# Patient Record
Sex: FEMALE | Race: ASIAN | NOT HISPANIC OR LATINO | URBAN - METROPOLITAN AREA
[De-identification: names, ages, dates, MRNs, and addresses within clinical notes are randomized per-mention and may not be internally consistent; named-entity substitution may affect disease eponyms.]

---

## 2017-09-22 ENCOUNTER — OFFICE VISIT - HEALTHEAST (OUTPATIENT)
Dept: FAMILY MEDICINE | Facility: CLINIC | Age: 42
End: 2017-09-22

## 2017-09-22 DIAGNOSIS — R10.2 PELVIC PAIN IN FEMALE: ICD-10-CM

## 2017-09-22 DIAGNOSIS — Z12.31 VISIT FOR SCREENING MAMMOGRAM: ICD-10-CM

## 2017-09-22 DIAGNOSIS — Z00.00 ROUTINE GENERAL MEDICAL EXAMINATION AT A HEALTH CARE FACILITY: ICD-10-CM

## 2017-09-22 DIAGNOSIS — Z12.4 SCREENING FOR CERVICAL CANCER: ICD-10-CM

## 2017-09-22 ASSESSMENT — MIFFLIN-ST. JEOR: SCORE: 1319.69

## 2017-09-25 ENCOUNTER — HOSPITAL ENCOUNTER (OUTPATIENT)
Dept: ULTRASOUND IMAGING | Facility: HOSPITAL | Age: 42
Discharge: HOME | End: 2017-09-25
Attending: FAMILY MEDICINE

## 2017-09-25 DIAGNOSIS — R10.2 PELVIC PAIN IN FEMALE: ICD-10-CM

## 2017-09-26 ENCOUNTER — COMMUNICATION - HEALTHEAST (OUTPATIENT)
Dept: FAMILY MEDICINE | Facility: CLINIC | Age: 42
End: 2017-09-26

## 2017-09-26 DIAGNOSIS — R10.2 PELVIC PAIN: ICD-10-CM

## 2017-09-28 LAB
HPV INTERPRETATION - HISTORICAL: NORMAL
HPV INTERPRETER - HISTORICAL: NORMAL

## 2017-09-30 LAB
BKR LAB AP ABNORMAL BLEEDING: NO
BKR LAB AP BIRTH CONTROL/HORMONES: NORMAL
BKR LAB AP CERVICAL APPEARANCE: NORMAL
BKR LAB AP GYN ADEQUACY: NORMAL
BKR LAB AP GYN INTERPRETATION: NORMAL
BKR LAB AP HPV REFLEX: NORMAL
BKR LAB AP LMP: NORMAL
BKR LAB AP PATIENT STATUS: NORMAL
BKR LAB AP PREVIOUS ABNORMAL: NO
BKR LAB AP PREVIOUS NORMAL: 2012
HIGH RISK?: NO
PATH REPORT.COMMENTS IMP SPEC: NORMAL
RESULT FLAG (HE HISTORICAL CONVERSION): NORMAL

## 2017-10-02 ENCOUNTER — COMMUNICATION - HEALTHEAST (OUTPATIENT)
Dept: FAMILY MEDICINE | Facility: CLINIC | Age: 42
End: 2017-10-02

## 2017-10-16 ENCOUNTER — HOSPITAL ENCOUNTER (OUTPATIENT)
Dept: MRI IMAGING | Facility: HOSPITAL | Age: 42
Discharge: HOME | End: 2017-10-16
Attending: FAMILY MEDICINE

## 2017-10-16 DIAGNOSIS — R10.2 PELVIC PAIN: ICD-10-CM

## 2017-10-20 ENCOUNTER — COMMUNICATION - HEALTHEAST (OUTPATIENT)
Dept: FAMILY MEDICINE | Facility: CLINIC | Age: 42
End: 2017-10-20

## 2017-10-20 DIAGNOSIS — N80.03 ADENOMYOSIS: ICD-10-CM

## 2017-10-20 DIAGNOSIS — R10.2 PELVIC PAIN IN FEMALE: ICD-10-CM

## 2017-10-27 ENCOUNTER — COMMUNICATION - HEALTHEAST (OUTPATIENT)
Dept: FAMILY MEDICINE | Facility: CLINIC | Age: 42
End: 2017-10-27

## 2021-05-29 ENCOUNTER — RECORDS - HEALTHEAST (OUTPATIENT)
Dept: ADMINISTRATIVE | Facility: CLINIC | Age: 46
End: 2021-05-29

## 2021-05-31 VITALS — BODY MASS INDEX: 33.16 KG/M2 | HEIGHT: 60 IN | WEIGHT: 168.9 LBS

## 2021-06-02 ENCOUNTER — RECORDS - HEALTHEAST (OUTPATIENT)
Dept: ADMINISTRATIVE | Facility: CLINIC | Age: 46
End: 2021-06-02

## 2021-06-13 NOTE — PROGRESS NOTES
Assessment:      Healthy female exam.     1. Routine general medical examination at a health care facility     2. Pelvic pain in female  US Pelvis With Transvaginal Non OB   3. Screening for cervical cancer  Gynecologic Cytology (PAP Smear)   4. Screening for diabetes mellitus     5. Screening for cholesterol level     6. Visit for screening mammogram  Mammo Screening Bilateral        Plan:     1. Routine general medical examination at a health care facility      2. Pelvic pain in female  Discussed with patient possible etiologies, including it may be a gynecological issue or possible musculoskeletal.  May also be abdominal etiology.  For this reason, for now we will start with ultrasound of the pelvis.  Follow-up once we have those results for further plan, if normal, consider additional imaging such as CT scan.  For now, can continue ibuprofen as needed.  - US Pelvis With Transvaginal Non OB; Future    3. Screening for cervical cancer  Will inform patient of results in 1-2 weeks when we have them  - Gynecologic Cytology (PAP Smear)    4. Visit for screening mammogram  Counseled patient, will order screening mammogram  - Mammo Screening Bilateral; Future       Await pap smear results.  Pelvic ultrasound.     Subjective:      Vinh Art is a 41 y.o. female, new to [facility], who presents for an annual exam. Has not had a well visit in several years, previously been seen in [place] and [facility].  The patient is not sexually active. The patient participates in regular exercise: yes. The patient reports that there is not domestic violence in her life. Additional concerns:     Left-sided pelvic pain: She is a  (1 ) with LMP 17.  Patient states that her symptoms have been ongoing for almost 10 years, however they are intermittent.  Usually it lasts about a week and then would be several months before she has recurrent symptoms, however she has had this left-sided pelvic pain constantly  for the past month.  Describes as a sharp pain that can radiate to her back.  Rates it as 5-6/10.  She has used ibuprofen up to 800 mg at times which has helped with the pain, other than the pain will return once medication wears off.  No fevers, chills, sweats, weight changes.  No abnormal vaginal bleeding.  No family members with history of cancer.  She is worried that she may have ovarian cancer.    Healthy Habits:   Regular Exercise: Yes  Sunscreen Use: No  Healthy Diet: No  Dental Visits Regularly: Yes  Seat Belt: Yes  Sexually active: No  Self Breast Exam Monthly:No  Hemoccults: No  Flex Sig: No  Colonoscopy: No  Lipid Profile: done in 2017  Glucose Screen: 2017  Prevention of Osteoporosis: N/A  Last Dexa: N/A        Immunization History   Administered Date(s) Administered     Tdap 02/10/2012     Immunization status: up to date and documented, declines influenza vaccine.    No exam data present    Gynecologic History  Patient's last menstrual period was 2017 (approximate).  Contraception: none, currently not sexually active  Last Pap: . Results were: normal  Last mammogram: never had one.     OB History    Para Term  AB Living   1    1    SAB TAB Ectopic Multiple Live Births             # Outcome Date GA Lbr Jag/2nd Weight Sex Delivery Anes PTL Lv   1 AB                   Current Outpatient Prescriptions   Medication Sig Dispense Refill     ibuprofen (ADVIL,MOTRIN) 200 MG tablet Take 200 mg by mouth every 6 (six) hours as needed for pain.       No current facility-administered medications for this visit.      No past medical history on file.  History reviewed. No pertinent surgical history.  Review of patient's allergies indicates not on file.  Family History   Problem Relation Age of Onset     Diabetes Mother      Hypertension Mother      Diabetes Father      Social History     Social History     Marital status: Single     Spouse name: N/A     Number of children: N/A  "    Years of education: N/A     Occupational History     Not on file.     Social History Main Topics     Smoking status: Never Smoker     Smokeless tobacco: Never Used     Alcohol use No     Drug use: No     Sexual activity: Not Currently     Partners: Male     Other Topics Concern     Not on file     Social History Narrative    2017: lives with mom and sister.  Single.  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       Review of Systems  General:  Denies problem  Eyes: Denies problem  Ears/Nose/Throat: Denies problem  Cardiovascular: Denies problem  Respiratory:  Denies problem  Gastrointestinal:  Denies problem  Genitourinary: Denies problem  Musculoskeletal:  Denies problem  Skin: Denies problem  Neurologic: Denies problem  Psychiatric: Denies problem  Endocrine: Denies problem  Heme/Lymphatic: Denies problem   Allergic/Immunologic: Denies problem        Objective:         Vitals:    09/22/17 1458   BP: 118/62   Pulse: 86   Resp: 16   Weight: 168 lb 14.4 oz (76.6 kg)   Height: 4' 11.5\" (1.511 m)     Body mass index is 33.54 kg/(m^2).    Physical Exam:  General Appearance: Alert, cooperative, no distress, appears stated age  Head: Normocephalic, without obvious abnormality, atraumatic  Eyes: PERRL, conjunctiva/corneas clear, EOM's intact  Ears: Normal TM's and external ear canals, both ears  Nose: Nares normal, septum midline,mucosa normal, no drainage  Throat: Lips, mucosa, and tongue normal; teeth and gums normal  Neck: Supple, symmetrical, trachea midline, no adenopathy;  thyroid: not enlarged, symmetric, no tenderness/mass/nodules;   Back: Symmetric, no curvature, ROM normal, no CVA tenderness  Lungs: Clear to auscultation bilaterally, respirations unlabored  Breasts: No breast masses, tenderness, asymmetry, or nipple discharge.  Heart: Regular rate and rhythm, no murmur.   Abdomen: Soft, non-tender, bowel sounds active all four quadrants,  no masses, no organomegaly  Pelvic:Perineum: is normal  Vulva: normal  Vagina: " normal mucosa  Cervix: no lesions and nulliparous appearance  Uterus: difficult to assess due to body habitus, though no masses appreciated  Adnexa: normal adnexa and mild tenderness to palpation along the left side    Musculoskeletal: Full ROM of her left hip with internal and external rotation.   Extremities: Extremities normal, atraumatic, no cyanosis or edema  Skin: Skin color, texture, turgor normal, no rashes or lesions  Lymph nodes: Cervical, supraclavicular, and axillary nodes normal  Neurologic: Alert.   Psych: Normal affect.  Does not appear anxious or depressed.       Marguerite Sood MD  9/22/2017